# Patient Record
Sex: FEMALE | Race: WHITE
[De-identification: names, ages, dates, MRNs, and addresses within clinical notes are randomized per-mention and may not be internally consistent; named-entity substitution may affect disease eponyms.]

---

## 2018-09-22 ENCOUNTER — HOSPITAL ENCOUNTER (EMERGENCY)
Dept: HOSPITAL 47 - EC | Age: 28
Discharge: HOME | End: 2018-09-22
Payer: COMMERCIAL

## 2018-09-22 ENCOUNTER — HOSPITAL ENCOUNTER (EMERGENCY)
Dept: HOSPITAL 47 - EC | Age: 28
LOS: 1 days | Discharge: HOME | End: 2018-09-23
Payer: COMMERCIAL

## 2018-09-22 VITALS
RESPIRATION RATE: 18 BRPM | TEMPERATURE: 98 F | SYSTOLIC BLOOD PRESSURE: 134 MMHG | HEART RATE: 80 BPM | DIASTOLIC BLOOD PRESSURE: 83 MMHG

## 2018-09-22 DIAGNOSIS — Z3A.01: ICD-10-CM

## 2018-09-22 DIAGNOSIS — O36.4XX1: Primary | ICD-10-CM

## 2018-09-22 DIAGNOSIS — O03.4: Primary | ICD-10-CM

## 2018-09-22 LAB
ANION GAP SERPL CALC-SCNC: 8 MMOL/L
BASOPHILS # BLD AUTO: 0 K/UL (ref 0–0.2)
BASOPHILS NFR BLD AUTO: 0 %
BUN SERPL-SCNC: 11 MG/DL (ref 7–17)
CALCIUM SPEC-MCNC: 9.8 MG/DL (ref 8.4–10.2)
CHLORIDE SERPL-SCNC: 104 MMOL/L (ref 98–107)
CO2 SERPL-SCNC: 28 MMOL/L (ref 22–30)
EOSINOPHIL # BLD AUTO: 0.1 K/UL (ref 0–0.7)
EOSINOPHIL NFR BLD AUTO: 1 %
ERYTHROCYTE [DISTWIDTH] IN BLOOD BY AUTOMATED COUNT: 4.27 M/UL (ref 3.8–5.4)
ERYTHROCYTE [DISTWIDTH] IN BLOOD: 12.4 % (ref 11.5–15.5)
GLUCOSE SERPL-MCNC: 96 MG/DL (ref 74–99)
HCT VFR BLD AUTO: 40.5 % (ref 34–46)
HGB BLD-MCNC: 13.3 GM/DL (ref 11.4–16)
LYMPHOCYTES # SPEC AUTO: 0.5 K/UL (ref 1–4.8)
LYMPHOCYTES NFR SPEC AUTO: 5 %
MCH RBC QN AUTO: 31.1 PG (ref 25–35)
MCHC RBC AUTO-ENTMCNC: 32.8 G/DL (ref 31–37)
MCV RBC AUTO: 94.8 FL (ref 80–100)
MONOCYTES # BLD AUTO: 0.5 K/UL (ref 0–1)
MONOCYTES NFR BLD AUTO: 5 %
NEUTROPHILS # BLD AUTO: 9.2 K/UL (ref 1.3–7.7)
NEUTROPHILS NFR BLD AUTO: 89 %
PH UR: 7 [PH] (ref 5–8)
PLATELET # BLD AUTO: 173 K/UL (ref 150–450)
POTASSIUM SERPL-SCNC: 4.3 MMOL/L (ref 3.5–5.1)
RBC UR QL: 2 /HPF (ref 0–5)
SODIUM SERPL-SCNC: 140 MMOL/L (ref 137–145)
SP GR UR: 1 (ref 1–1.03)
SQUAMOUS UR QL AUTO: <1 /HPF (ref 0–4)
UROBILINOGEN UR QL STRIP: <2 MG/DL (ref ?–2)
WBC # BLD AUTO: 10.4 K/UL (ref 3.8–10.6)
WBC #/AREA URNS HPF: <1 /HPF (ref 0–5)

## 2018-09-22 PROCEDURE — 85025 COMPLETE CBC W/AUTO DIFF WBC: CPT

## 2018-09-22 PROCEDURE — 76801 OB US < 14 WKS SINGLE FETUS: CPT

## 2018-09-22 PROCEDURE — 84702 CHORIONIC GONADOTROPIN TEST: CPT

## 2018-09-22 PROCEDURE — 76817 TRANSVAGINAL US OBSTETRIC: CPT

## 2018-09-22 PROCEDURE — 36415 COLL VENOUS BLD VENIPUNCTURE: CPT

## 2018-09-22 PROCEDURE — 86900 BLOOD TYPING SEROLOGIC ABO: CPT

## 2018-09-22 PROCEDURE — 86901 BLOOD TYPING SEROLOGIC RH(D): CPT

## 2018-09-22 PROCEDURE — 99284 EMERGENCY DEPT VISIT MOD MDM: CPT

## 2018-09-22 PROCEDURE — 87086 URINE CULTURE/COLONY COUNT: CPT

## 2018-09-22 PROCEDURE — 96374 THER/PROPH/DIAG INJ IV PUSH: CPT

## 2018-09-22 PROCEDURE — 96361 HYDRATE IV INFUSION ADD-ON: CPT

## 2018-09-22 PROCEDURE — 81001 URINALYSIS AUTO W/SCOPE: CPT

## 2018-09-22 PROCEDURE — 80048 BASIC METABOLIC PNL TOTAL CA: CPT

## 2018-09-22 PROCEDURE — 81025 URINE PREGNANCY TEST: CPT

## 2018-09-22 NOTE — US
EXAMINATION TYPE: Transabdominal

 

DATE OF EXAM: 4/3/18

 

COMPARISON: NONE

 

CLINICAL HISTORY: Pain.

 

EXAM PERFORMED:  Transvaginal (TV) and Transabdominal (TA)

 

EXAM MEASUREMENTS:

 

GESTATIONAL AGE / DATING

 

Physician Established: Not yet established

Dates by LMP: (7 weeks/2 days)  

** EDC: 5/9/2019

Dates by First Scan:  No previous this is first scan 

Dates by Current Scan for: Nonviable IUP 

 

MATERNAL ANATOMY 

 

Uterus: Anteverted, nabothian cyst seen at cervix; 9.7 x 4.9 x 6.5 cm 

Endometrium: Prominent and complex; 2.4 cm

Right Ovary: Not visualized at this time due to bowel

Left Ovary: Appears wnl; 3.1 x 2.0 x 2.4 cm

Post CDS / Adnexa: Wnl

Presence of free fluid: No

Presence of corpus luteal cyst: No

Presence of subchorionic bleed: No

 

GESTATION / FETAL SURVEY

NONVIABLE IUP IN [place] 

 

CRL: 0.4 cm (6 weeks/0 days)

MSD: irregular shaped 1.1 cm (5 weeks/2 days)

Yolk Sac (normal less than 6mm):

Heart Rate: 0 bpm

IUP:  Fetal Demise

 

Date of LMP: 8/2/2018

Beta HcG (if available): Not available at this time

 

IMPRESSION:

1. FETAL DEMISE.

2. NABOTHIAN CYSTS.

## 2018-09-22 NOTE — ED
General Adult HPI





- General


Chief complaint: Urogenital


Stated complaint: Bleeding/Cramping (7wks preg)


Source: patient


Mode of arrival: ambulatory


Limitations: no limitations





- History of Present Illness


Initial comments: 





Dictation was produced using dragon dictation software. please excuse any 

grammatical, word or spelling errors. 





Chief Complaint: 28-year-old  female presents with vaginal bleeding 

and pregnancy.  





History of Present Illness: 28-year-old  female presents with vaginal 

bleeding and pregnancy.  Patient states that she was seen by her GYN for 

vaginal bleeding in pregnancy last week.  This patient's first pregnancy.  

Patient denies any medical complaints.  She does have some cramping.  She was 

seen by GYN were ultrasound was performed she was given updated of 6 weeks in a 

couple days.  She did have a closed cervical os at this time.








The ROS documented in this emergency department record has been reviewed and 

confirmed by me.  Those systems with pertinent positive or negative responses 

have been documented in the HPI.  All other systems are other negative and/or 

noncontributory.





- Related Data


 Allergies











Allergy/AdvReac Type Severity Reaction Status Date / Time


 


No Known Allergies Allergy   Verified 09/22/18 10:21














Review of Systems


ROS Statement: 


Those systems with pertinent positive or pertinent negative responses have been 

documented in the HPI.





ROS Other: All systems not noted in ROS Statement are negative.





Past Medical History


Past Medical History: No Reported History


History of Any Multi-Drug Resistant Organisms: None Reported


Past Surgical History: No Surgical Hx Reported


Past Psychological History: No Psychological Hx Reported


Smoking Status: Never smoker


Past Alcohol Use History: None Reported


Past Drug Use History: None Reported





General Exam





- General Exam Comments


Initial Comments: 








PHYSICAL EXAM:


General Impression: Alert and oriented x3, not in acute distress


HEENT: Normocephalic atraumatic, extra-ocular movements intact, pupils equal 

and reactive to light bilaterally, mucous membranes moist.


Cardiovascular: Heart regular rate and rhythm, S1&S2 audible, no murmurs, rubs 

or gallops


Chest: Lungs clear to auscultation bilaterally, no rhonchi, no wheeze, no rales


Abdomen: Bowel sounds present, abdomen soft, non-tender, non-distended, no 

organomegaly


Musculoskeletal: Pulses present and equal in all extremities, no peripheral 

edema


Motor: Power 5/5 bilaterally, no focal deficits noted


Neurological: CN II-XII grossly intact, no focal motor or sensory deficits noted


Skin: Intact with no visualized rashes


Psych: Normal affect and mood


Limitations: no limitations





Course


 Vital Signs











  09/22/18





  10:19


 


Temperature 98.3 F


 


Pulse Rate 97


 


Respiratory 20





Rate 


 


Blood Pressure 119/79


 


O2 Sat by Pulse 99





Oximetry 














Medical Decision Making





- Medical Decision Making








ED course: 28-year-old female presents with vaginal bleeding and cramping and 

pregnancy.  It is allegedly 7 weeks based on last menstrual period.  Vital 

signs upon arrival within normal limits.Laboratory evaluation obtained.  CBC is 

unremarkable.  Metabolic panel is negative.  Urinalysis does not show any signs 

to suggest urinary tract infection.  Transvaginal ultrasound was obtained 

showing fetal demise area and there is also findings of nabothian cysts.  

Offered pelvic exam to patient.  They state that they would rather defer and 

obtain pelvic exam from OB/GYN.  Patient told to expect moderate bleeding and 

pelvic cramping.  She is told to take over-the-counter Tylenol for her 

symptoms.  Still to follow-up with her OB/GYN for repeat evaluation for 

miscarriage.  Patient is understandable and agreeable to disposition.  Told to 

watch for symptoms of lightheadedness, persistent vaginal bleeding or 

constitutional symptoms especially fever and worsening pelvic pain.  

Understandable and agreeable to disposition.











- Lab Data


Result diagrams: 


 09/22/18 11:10





 09/22/18 11:10


 Lab Results











  09/22/18 09/22/18 09/22/18 Range/Units





  11:10 11:10 11:10 


 


WBC    10.4  (3.8-10.6)  k/uL


 


RBC    4.27  (3.80-5.40)  m/uL


 


Hgb    13.3  (11.4-16.0)  gm/dL


 


Hct    40.5  (34.0-46.0)  %


 


MCV    94.8  (80.0-100.0)  fL


 


MCH    31.1  (25.0-35.0)  pg


 


MCHC    32.8  (31.0-37.0)  g/dL


 


RDW    12.4  (11.5-15.5)  %


 


Plt Count    173  (150-450)  k/uL


 


Neutrophils %    89  %


 


Lymphocytes %    5  %


 


Monocytes %    5  %


 


Eosinophils %    1  %


 


Basophils %    0  %


 


Neutrophils #    9.2 H  (1.3-7.7)  k/uL


 


Lymphocytes #    0.5 L  (1.0-4.8)  k/uL


 


Monocytes #    0.5  (0-1.0)  k/uL


 


Eosinophils #    0.1  (0-0.7)  k/uL


 


Basophils #    0.0  (0-0.2)  k/uL


 


Sodium   140   (137-145)  mmol/L


 


Potassium   4.3   (3.5-5.1)  mmol/L


 


Chloride   104   ()  mmol/L


 


Carbon Dioxide   28   (22-30)  mmol/L


 


Anion Gap   8   mmol/L


 


BUN   11   (7-17)  mg/dL


 


Creatinine   0.85   (0.52-1.04)  mg/dL


 


Est GFR (CKD-EPI)AfAm   >90   (>60 ml/min/1.73 sqM)  


 


Est GFR (CKD-EPI)NonAf   >90   (>60 ml/min/1.73 sqM)  


 


Glucose   96   (74-99)  mg/dL


 


Calcium   9.8   (8.4-10.2)  mg/dL


 


Urine Color     


 


Urine Appearance     (Clear)  


 


Urine pH     (5.0-8.0)  


 


Ur Specific Gravity     (1.001-1.035)  


 


Urine Protein     (Negative)  


 


Urine Glucose (UA)     (Negative)  


 


Urine Ketones     (Negative)  


 


Urine Blood     (Negative)  


 


Urine Nitrite     (Negative)  


 


Urine Bilirubin     (Negative)  


 


Urine Urobilinogen     (<2.0)  mg/dL


 


Ur Leukocyte Esterase     (Negative)  


 


Urine RBC     (0-5)  /hpf


 


Urine WBC     (0-5)  /hpf


 


Ur Squamous Epith Cells     (0-4)  /hpf


 


Urine Bacteria     (None)  /hpf


 


Urine Mucus     (None)  /hpf


 


Urine HCG, Qual     (Not Detectd)  


 


Blood Type  O Positive    


 


Blood Type Recheck  [facility] ONLY    














  09/22/18 09/22/18 Range/Units





  11:10 11:10 


 


WBC    (3.8-10.6)  k/uL


 


RBC    (3.80-5.40)  m/uL


 


Hgb    (11.4-16.0)  gm/dL


 


Hct    (34.0-46.0)  %


 


MCV    (80.0-100.0)  fL


 


MCH    (25.0-35.0)  pg


 


MCHC    (31.0-37.0)  g/dL


 


RDW    (11.5-15.5)  %


 


Plt Count    (150-450)  k/uL


 


Neutrophils %    %


 


Lymphocytes %    %


 


Monocytes %    %


 


Eosinophils %    %


 


Basophils %    %


 


Neutrophils #    (1.3-7.7)  k/uL


 


Lymphocytes #    (1.0-4.8)  k/uL


 


Monocytes #    (0-1.0)  k/uL


 


Eosinophils #    (0-0.7)  k/uL


 


Basophils #    (0-0.2)  k/uL


 


Sodium    (137-145)  mmol/L


 


Potassium    (3.5-5.1)  mmol/L


 


Chloride    ()  mmol/L


 


Carbon Dioxide    (22-30)  mmol/L


 


Anion Gap    mmol/L


 


BUN    (7-17)  mg/dL


 


Creatinine    (0.52-1.04)  mg/dL


 


Est GFR (CKD-EPI)AfAm    (>60 ml/min/1.73 sqM)  


 


Est GFR (CKD-EPI)NonAf    (>60 ml/min/1.73 sqM)  


 


Glucose    (74-99)  mg/dL


 


Calcium    (8.4-10.2)  mg/dL


 


Urine Color   Colorless  


 


Urine Appearance   Clear  (Clear)  


 


Urine pH   7.0  (5.0-8.0)  


 


Ur Specific Gravity   1.003  (1.001-1.035)  


 


Urine Protein   Negative  (Negative)  


 


Urine Glucose (UA)   Negative  (Negative)  


 


Urine Ketones   Negative  (Negative)  


 


Urine Blood   Large H  (Negative)  


 


Urine Nitrite   Negative  (Negative)  


 


Urine Bilirubin   Negative  (Negative)  


 


Urine Urobilinogen   <2.0  (<2.0)  mg/dL


 


Ur Leukocyte Esterase   Negative  (Negative)  


 


Urine RBC   2  (0-5)  /hpf


 


Urine WBC   <1  (0-5)  /hpf


 


Ur Squamous Epith Cells   <1  (0-4)  /hpf


 


Urine Bacteria   Rare H  (None)  /hpf


 


Urine Mucus   Rare H  (None)  /hpf


 


Urine HCG, Qual  Detected   (Not Detectd)  


 


Blood Type    


 


Blood Type Recheck    














Disposition


Clinical Impression: 


 Fetal demise





Disposition: HOME SELF-CARE


Condition: Good


Instructions:  Miscarriage (ED)


Additional Instructions: 


follow up with obgyn next week


Is patient prescribed a controlled substance at d/c from ED?: No


Referrals: 


None,Stated [Primary Care Provider] - 1-2 days


Time of Disposition: 12:46

## 2018-09-23 VITALS
HEART RATE: 75 BPM | SYSTOLIC BLOOD PRESSURE: 95 MMHG | DIASTOLIC BLOOD PRESSURE: 62 MMHG | RESPIRATION RATE: 14 BRPM | TEMPERATURE: 97.8 F

## 2018-09-23 LAB
BASOPHILS # BLD AUTO: 0 K/UL (ref 0–0.2)
BASOPHILS NFR BLD AUTO: 0 %
EOSINOPHIL # BLD AUTO: 0.1 K/UL (ref 0–0.7)
EOSINOPHIL NFR BLD AUTO: 1 %
ERYTHROCYTE [DISTWIDTH] IN BLOOD BY AUTOMATED COUNT: 3.93 M/UL (ref 3.8–5.4)
ERYTHROCYTE [DISTWIDTH] IN BLOOD: 12.4 % (ref 11.5–15.5)
HCT VFR BLD AUTO: 36.7 % (ref 34–46)
HGB BLD-MCNC: 12.7 GM/DL (ref 11.4–16)
LYMPHOCYTES # SPEC AUTO: 0.7 K/UL (ref 1–4.8)
LYMPHOCYTES NFR SPEC AUTO: 6 %
MCH RBC QN AUTO: 32.2 PG (ref 25–35)
MCHC RBC AUTO-ENTMCNC: 34.5 G/DL (ref 31–37)
MCV RBC AUTO: 93.5 FL (ref 80–100)
MONOCYTES # BLD AUTO: 0.6 K/UL (ref 0–1)
MONOCYTES NFR BLD AUTO: 5 %
NEUTROPHILS # BLD AUTO: 10.3 K/UL (ref 1.3–7.7)
NEUTROPHILS NFR BLD AUTO: 88 %
PLATELET # BLD AUTO: 169 K/UL (ref 150–450)
WBC # BLD AUTO: 11.7 K/UL (ref 3.8–10.6)

## 2018-09-23 NOTE — ED
Female Urogenital HPI





- General


Chief complaint: Abdominal Pain


Stated complaint: Pelvic/Back Pain


Time Seen by Provider: 18 23:09


Source: patient


Mode of arrival: ambulatory


Limitations: no limitations





- History of Present Illness


Initial comments: 





This patient is 28-year-old woman who presents to be evaluated for lower 

abdominal pain and vaginal bleeding.  The patient recently diagnosed with 

pregnancy.  She had been seen here earlier in the day having an ultrasound that 

appeared to show a nonviable pregnancy.  She was discharged and then tonight 

was having an increase in the low abdominal cramping type pain.  She states she 

also was having an increase in leading.  She has gone through a total of 5 pads 

over the course of today area she has not noted passing any tissue.  The 

patient states his first pregnancy, and that her obstetrician is closer to the 

[facility].


MD Complaint: vaginal bleeding, pelvic pain


Onset/Timin


-: days(s)


Location: suprapubic


Radiation: non-radiating


Severity: severe


Quality: cramping, sharp


Consistency: constant


Improves with: none


Worsens with: none


Patient Pregnant: Yes


Associated Symptoms: vaginal bleeding





- Related Data


Sexually active: Yes


: 1


Para: 0


 Home Medications











 Medication  Instructions  Recorded  Confirmed


 


No Known Home Medications  18











 Allergies











Allergy/AdvReac Type Severity Reaction Status Date / Time


 


No Known Allergies Allergy   Verified 18 22:59














Review of Systems


ROS Statement: 


Those systems with pertinent positive or pertinent negative responses have been 

documented in the HPI.





ROS Other: All systems not noted in ROS Statement are negative.


Constitutional: Denies: fever, chills, weakness


Respiratory: Denies: cough, dyspnea


Cardiovascular: Denies: chest pain, palpitations, edema, syncope


Gastrointestinal: Reports: as per HPI, abdominal pain.  Denies: nausea, vomiting

, diarrhea


Genitourinary: Reports: as per HPI, abnormal menses (Vaginal bleeding).  Denies

: urgency, dysuria, frequency, hematuria


Musculoskeletal: Denies: back pain


Skin: Denies: rash


Neurological: Denies: headache, weakness, numbness


Hematological/Lymphatic: Denies: easy bleeding





Past Medical History


Past Medical History: No Reported History


History of Any Multi-Drug Resistant Organisms: None Reported


Past Surgical History: No Surgical Hx Reported


Past Psychological History: No Psychological Hx Reported


Smoking Status: Never smoker


Past Alcohol Use History: None Reported


Past Drug Use History: None Reported





General Exam


Limitations: no limitations


General appearance: alert, in no apparent distress


Head exam: Present: atraumatic, normocephalic


Eye exam: Present: normal appearance.  Absent: scleral icterus, conjunctival 

injection


ENT exam: Present: normal oropharynx


Neck exam: Present: normal inspection, full ROM


Respiratory exam: Present: normal lung sounds bilaterally.  Absent: respiratory 

distress, wheezes, rales, rhonchi, stridor


Cardiovascular Exam: Present: regular rate, normal rhythm, normal heart sounds.

  Absent: systolic murmur, diastolic murmur, rubs, gallop


GI/Abdominal exam: Present: soft.  Absent: distended, tenderness, guarding, 

rebound, rigid


External exam: Present: normal external exam


Speculum exam: Present: vaginal bleeding.  Absent: vaginal discharge, cervical 

discharge, foreign body, tissue, laceration


By manual exam: Present: normal by manual exam.  Absent: cervical motion 

tenderness, adnexal tenderness, adnexal mass, uterine enlargement, uterine 

tenderness


Extremities exam: Present: normal inspection, normal capillary refill.  Absent: 

pedal edema, calf tenderness


Back exam: Present: normal inspection.  Absent: CVA tenderness (R), CVA 

tenderness (L)


Skin exam: Present: warm, dry, intact, normal color.  Absent: rash





Course


 Vital Signs











  18





  22:59 02:41


 


Temperature 98.2 F 97.8 F


 


Pulse Rate 96 75


 


Respiratory 16 14





Rate  


 


Blood Pressure 127/82 95/62


 


O2 Sat by Pulse 100 97





Oximetry  














Medical Decision Making





- Medical Decision Making





Patient is a 28-year-old woman who does appear to be having a miscarriage.  The 

ultrasound earlier did show a nonviable IUP.  Following some analgesia, patient 

states her symptoms are markedly improved.  On the gynecological exam there is 

some blood in the vagina but there is only a trace of blood from the cervix at 

the moment.  She is hemodynamically stable and would like to follow with her 

gynecologist.  Did discuss the appropriate follow-up as well as return 

parameters.





- Lab Data


Result diagrams: 


 18 00:05





 Lab Results











  18 Range/Units





  00:05 00:05 00:05 


 


WBC  11.7 H    (3.8-10.6)  k/uL


 


RBC  3.93    (3.80-5.40)  m/uL


 


Hgb  12.7    (11.4-16.0)  gm/dL


 


Hct  36.7    (34.0-46.0)  %


 


MCV  93.5    (80.0-100.0)  fL


 


MCH  32.2    (25.0-35.0)  pg


 


MCHC  34.5    (31.0-37.0)  g/dL


 


RDW  12.4    (11.5-15.5)  %


 


Plt Count  169    (150-450)  k/uL


 


Neutrophils %  88    %


 


Lymphocytes %  6    %


 


Monocytes %  5    %


 


Eosinophils %  1    %


 


Basophils %  0    %


 


Neutrophils #  10.3 H    (1.3-7.7)  k/uL


 


Lymphocytes #  0.7 L    (1.0-4.8)  k/uL


 


Monocytes #  0.6    (0-1.0)  k/uL


 


Eosinophils #  0.1    (0-0.7)  k/uL


 


Basophils #  0.0    (0-0.2)  k/uL


 


HCG, Quant    915.5  mIU/mL


 


Blood Type   O Positive   


 


Blood Type Recheck   No   














Disposition


Clinical Impression: 


 Incomplete 





Disposition: HOME SELF-CARE


Condition: Fair


Instructions:  Threatened Miscarriage (ED)


Is patient prescribed a controlled substance at d/c from ED?: No


Referrals: 


None,Stated [Primary Care Provider] - 1-2 days


Gianfranco Yung MD [STAFF PHYSICIAN] - 1-2 days